# Patient Record
Sex: FEMALE | Race: WHITE | NOT HISPANIC OR LATINO | URBAN - NONMETROPOLITAN AREA
[De-identification: names, ages, dates, MRNs, and addresses within clinical notes are randomized per-mention and may not be internally consistent; named-entity substitution may affect disease eponyms.]

---

## 2017-09-18 PROBLEM — H52.4: Noted: 2017-09-18

## 2017-09-18 PROBLEM — H35.373: Noted: 2017-10-03

## 2017-09-18 PROBLEM — H40.1112: Noted: 2017-10-03

## 2017-09-18 PROBLEM — H25.812: Noted: 2017-10-03

## 2017-09-18 PROBLEM — H25.812: Noted: 2019-07-08

## 2017-09-18 PROBLEM — H40.1112: Noted: 2019-07-08

## 2018-07-03 NOTE — PATIENT DISCUSSION
Take antibiotic drops 6 times a day for 2 weeks, oral antibiotics twice a day for 10 days, follow up 10 days.

## 2019-06-26 ENCOUNTER — IMPORTED ENCOUNTER (OUTPATIENT)
Dept: URBAN - NONMETROPOLITAN AREA CLINIC 1 | Facility: CLINIC | Age: 79
End: 2019-06-26

## 2019-06-26 PROCEDURE — 92134 CPTRZ OPH DX IMG PST SGM RTA: CPT

## 2019-06-26 PROCEDURE — 99213 OFFICE O/P EST LOW 20 MIN: CPT

## 2019-06-26 NOTE — PATIENT DISCUSSION
COAG-pt states compliant since last exam (hx of non compliance)-good response to Simbrinza-continue Latanoprost OD qhs and Simbrinza OD BID. Stressed importance of compliance. Cataract OS-Visually significant.-Cataract(s) causing symptomatic impairment of visual function not correctable with a tolerable change in glasses or contact lenses lighting or non-operative means resulting in specific activity limitations and/or participation restrictions including but not limited to reading viewing television driving or meeting vocational or recreational needs. -Expectation is clearer vision and reduced glare disability after cataract removal.-Refer to Dr Shelly Abraham for cataract evaluationERM OU-OCT MAC performed and reviewed w/pt stable-Continue to monitor

## 2019-07-08 ENCOUNTER — IMPORTED ENCOUNTER (OUTPATIENT)
Dept: URBAN - NONMETROPOLITAN AREA CLINIC 1 | Facility: CLINIC | Age: 79
End: 2019-07-08

## 2019-07-08 PROCEDURE — 92014 COMPRE OPH EXAM EST PT 1/>: CPT

## 2019-07-08 NOTE — PATIENT DISCUSSION
Cataract(s)-Visually significant cataract OS . -Cataract(s) causing symptomatic impairment of visual function not correctable with a tolerable change in glasses or contact lenses lighting or non-operative means resulting in specific activity limitations and/or participation restrictions including but not limited to reading viewing television driving or meeting vocational or recreational needs. -Expectation is clearer vision and functional improvement in symptoms as well as reduced glare disability after cataract removal.-Order IOLMaster and OPD today. -Recommend Stand\Trad based on today's OPD testing and lifestyle questionnaire.-All questions were answered regarding surgery including pre and post-op medications appointments activity restrictions and anesthetic usage.-The risks benefits and alternatives and special risk factors for the patient were discussed in detail including but not limited to: bleeding infection retinal detachment vitreous loss problems with the implant and possible need for additional surgery.-Although rare the possibility of complete vision loss was discussed.-The possible need for glasses post-operatively was discussed.-Order medical clearance exam.-Patient elects to proceed with cataract surgery OS. COAG-pt states compliant since last exam (hx of non compliance)-good response to Simbrinza-continue Latanoprost OD qhs and Simbrinza OD BID. Stressed importance of compliance.  *Epiretinal membrane: OU-Discussed findings of exam in detail with the patient.-Discussed the signs of worsening of the disease.-Continue to monitor

## 2019-07-25 PROBLEM — H52.4: Noted: 2019-07-25

## 2019-07-25 PROBLEM — H25.812: Noted: 2019-07-25

## 2019-07-25 PROBLEM — H40.1112: Noted: 2019-07-25

## 2019-07-25 PROBLEM — H35.373: Noted: 2019-07-25

## 2019-07-29 ENCOUNTER — IMPORTED ENCOUNTER (OUTPATIENT)
Dept: URBAN - NONMETROPOLITAN AREA CLINIC 1 | Facility: CLINIC | Age: 79
End: 2019-07-29

## 2019-07-29 PROBLEM — H40.1112: Noted: 2019-07-29

## 2019-07-29 PROBLEM — I10: Noted: 2019-07-29

## 2019-07-29 PROBLEM — H25.812: Noted: 2019-07-29

## 2019-07-29 PROBLEM — Z01.818: Noted: 2019-07-29

## 2019-07-29 NOTE — PATIENT DISCUSSION
Medical Clearance done todayNo outstanding concernsPatient is cleared for surgery.; 's Notes: <br />

## 2019-08-09 ENCOUNTER — IMPORTED ENCOUNTER (OUTPATIENT)
Dept: URBAN - NONMETROPOLITAN AREA CLINIC 1 | Facility: CLINIC | Age: 79
End: 2019-08-09

## 2019-08-09 PROCEDURE — 99024 POSTOP FOLLOW-UP VISIT: CPT

## 2019-08-09 NOTE — PATIENT DISCUSSION
s/p PCIOL-Pt doing well s/p PCIOL. -Continue post-op gtts according to instruction sheet and sleep with eye shield over eye for 7 nights.-Avoid bending at the waist lifting anything over 5lbs and dirty or candido environments. Perisentesis done today to drain exces fluid and relieve pressure.  -RTC EZE

## 2019-08-13 ENCOUNTER — IMPORTED ENCOUNTER (OUTPATIENT)
Dept: URBAN - NONMETROPOLITAN AREA CLINIC 1 | Facility: CLINIC | Age: 79
End: 2019-08-13

## 2019-08-13 PROBLEM — Z98.42: Noted: 2019-08-13

## 2019-08-13 PROBLEM — Z96.1: Noted: 2019-08-13

## 2019-08-13 PROBLEM — H40.1112: Noted: 2019-08-13

## 2019-08-13 PROCEDURE — 99024 POSTOP FOLLOW-UP VISIT: CPT

## 2019-08-13 NOTE — PATIENT DISCUSSION
s/p PCIOL OS Stand/Trad -Pt doing well at 1 week s/p PCIOL. -Continue post-op gtts according to instruction sheet. - D/C Latanoprost OD and Simbrinza OD for 2 weeks to find out if drug reaction after 2 weeks patient is able to restart both gtts. -Okay to resume usual activites and d/c eye shield. RTC  1 month POV

## 2019-08-22 PROBLEM — Z98.42: Noted: 2019-08-22

## 2019-08-22 PROBLEM — H40.1112: Noted: 2019-08-22

## 2019-09-16 ENCOUNTER — IMPORTED ENCOUNTER (OUTPATIENT)
Dept: URBAN - NONMETROPOLITAN AREA CLINIC 1 | Facility: CLINIC | Age: 79
End: 2019-09-16

## 2019-09-16 PROCEDURE — 99024 POSTOP FOLLOW-UP VISIT: CPT

## 2020-01-14 ENCOUNTER — IMPORTED ENCOUNTER (OUTPATIENT)
Dept: URBAN - NONMETROPOLITAN AREA CLINIC 1 | Facility: CLINIC | Age: 80
End: 2020-01-14

## 2020-01-14 PROBLEM — H40.1112: Noted: 2020-01-14

## 2020-01-14 PROBLEM — Z96.1: Noted: 2020-01-14

## 2020-01-14 PROCEDURE — 92083 EXTENDED VISUAL FIELD XM: CPT

## 2020-01-14 PROCEDURE — 92014 COMPRE OPH EXAM EST PT 1/>: CPT

## 2020-01-14 NOTE — PATIENT DISCUSSION
COAG-Appears stable. -stable w/o gttsVF TODAY/ STABLE OUpseudophakia oumonitors/p rd repair ODSTABLE MONITOR

## 2020-07-21 ENCOUNTER — IMPORTED ENCOUNTER (OUTPATIENT)
Dept: URBAN - NONMETROPOLITAN AREA CLINIC 1 | Facility: CLINIC | Age: 80
End: 2020-07-21

## 2020-07-21 PROCEDURE — 92012 INTRM OPH EXAM EST PATIENT: CPT

## 2020-07-21 PROCEDURE — 92133 CPTRZD OPH DX IMG PST SGM ON: CPT

## 2021-01-21 ENCOUNTER — IMPORTED ENCOUNTER (OUTPATIENT)
Dept: URBAN - NONMETROPOLITAN AREA CLINIC 1 | Facility: CLINIC | Age: 81
End: 2021-01-21

## 2021-01-21 PROCEDURE — 92014 COMPRE OPH EXAM EST PT 1/>: CPT

## 2021-01-21 NOTE — PATIENT DISCUSSION
COAG-Appears stable. -stable w/o gtts-monitor for iop and vf changespseudophakia oumonitor for pcos/p rd repair ODSTABLE MONITOR

## 2021-08-25 ENCOUNTER — PREPPED CHART (OUTPATIENT)
Dept: RURAL CLINIC 2 | Facility: CLINIC | Age: 81
End: 2021-08-25

## 2021-08-25 ENCOUNTER — IMPORTED ENCOUNTER (OUTPATIENT)
Dept: URBAN - NONMETROPOLITAN AREA CLINIC 1 | Facility: CLINIC | Age: 81
End: 2021-08-25

## 2021-08-25 PROBLEM — H35.371: Noted: 2021-08-25

## 2021-08-25 PROBLEM — H40.1112: Noted: 2020-01-14

## 2021-08-25 PROBLEM — H35.341: Noted: 2021-08-25

## 2021-08-25 PROBLEM — Z96.1: Noted: 2021-08-25

## 2021-08-25 PROBLEM — H16.223: Noted: 2021-08-25

## 2021-08-25 PROCEDURE — 92014 COMPRE OPH EXAM EST PT 1/>: CPT

## 2021-08-25 PROCEDURE — 92083 EXTENDED VISUAL FIELD XM: CPT

## 2021-08-25 NOTE — PATIENT DISCUSSION
COAG-Appears stable. -stable w/o gtts-VF performed and reviewed w/pt -Order OCT ONH at next visit ERM/Cyst OD Monitor Ksicca recommend thera tears bid OU sample given pseudophakia oumonitor for pcos/p rd repair ODSTABLE MONITOR

## 2022-02-23 ENCOUNTER — ESTABLISHED PATIENT (OUTPATIENT)
Dept: RURAL CLINIC 2 | Facility: CLINIC | Age: 82
End: 2022-02-23

## 2022-02-23 DIAGNOSIS — Z96.1: ICD-10-CM

## 2022-02-23 DIAGNOSIS — H35.371: ICD-10-CM

## 2022-02-23 DIAGNOSIS — H40.1112: ICD-10-CM

## 2022-02-23 DIAGNOSIS — H40.1121: ICD-10-CM

## 2022-02-23 DIAGNOSIS — H16.223: ICD-10-CM

## 2022-02-23 DIAGNOSIS — Z98.890: ICD-10-CM

## 2022-02-23 PROCEDURE — 92133 CPTRZD OPH DX IMG PST SGM ON: CPT

## 2022-02-23 PROCEDURE — 92014 COMPRE OPH EXAM EST PT 1/>: CPT

## 2022-02-23 ASSESSMENT — VISUAL ACUITY
OD_SC: 20/400
OS_SC: 20/20-1
OD_PH: 20/70-2

## 2022-02-23 ASSESSMENT — TONOMETRY
OS_IOP_MMHG: 18
OD_IOP_MMHG: 20

## 2022-04-10 ASSESSMENT — VISUAL ACUITY
OS_GLARE: 20/100
OD_CC: 20/200
OD_CC: 20/400
OD_CC: 20/200
OS_CC: 20/20
OS_CC: 20/20
OD_SC: 20/400
OS_CC: 20/20
OD_CC: 20/200
OS_CC: 20/25
OS_CC: 20/30
OS_AM: 20/30
OS_SC: 20/30
OS_SC: 20/40
OD_SC: 20/400

## 2022-04-10 ASSESSMENT — TONOMETRY
OS_IOP_MMHG: 45
OD_IOP_MMHG: 19
OS_IOP_MMHG: 18
OD_IOP_MMHG: 16
OS_IOP_MMHG: 17
OS_IOP_MMHG: 16
OS_IOP_MMHG: 14
OS_IOP_MMHG: 20
OS_IOP_MMHG: 17
OD_IOP_MMHG: 19
OD_IOP_MMHG: 13
OS_IOP_MMHG: 22
OD_IOP_MMHG: 18
OD_IOP_MMHG: 19
OD_IOP_MMHG: 20
OS_IOP_MMHG: 17
OS_IOP_MMHG: 21

## 2022-08-01 NOTE — PATIENT DISCUSSION
COAG-Appears stable. -stable w/o gttsoct today/stable oupseudophakia oumonitor for pcos/p rd repair ODSTABLE MONITOR pulse oximetry

## 2022-08-23 ENCOUNTER — ESTABLISHED PATIENT (OUTPATIENT)
Dept: RURAL CLINIC 2 | Facility: CLINIC | Age: 82
End: 2022-08-23

## 2022-08-23 DIAGNOSIS — H40.1112: ICD-10-CM

## 2022-08-23 DIAGNOSIS — Z98.890: ICD-10-CM

## 2022-08-23 DIAGNOSIS — H35.371: ICD-10-CM

## 2022-08-23 DIAGNOSIS — H40.1121: ICD-10-CM

## 2022-08-23 DIAGNOSIS — Z96.1: ICD-10-CM

## 2022-08-23 DIAGNOSIS — H16.223: ICD-10-CM

## 2022-08-23 PROCEDURE — 92014 COMPRE OPH EXAM EST PT 1/>: CPT

## 2022-08-23 PROCEDURE — 92134 CPTRZ OPH DX IMG PST SGM RTA: CPT

## 2022-08-23 ASSESSMENT — TONOMETRY
OS_IOP_MMHG: 16
OD_IOP_MMHG: 18

## 2022-08-23 ASSESSMENT — VISUAL ACUITY
OS_SC: 20/25
OD_SC: 20/100
OD_PH: 20/70-2

## 2022-12-27 ENCOUNTER — ESTABLISHED PATIENT (OUTPATIENT)
Dept: RURAL CLINIC 2 | Facility: CLINIC | Age: 82
End: 2022-12-27

## 2022-12-27 PROCEDURE — 92083 EXTENDED VISUAL FIELD XM: CPT

## 2022-12-27 PROCEDURE — 99214 OFFICE O/P EST MOD 30 MIN: CPT

## 2022-12-27 RX ORDER — DORZOLAMIDE HYDROCHLORIDE TIMOLOL MALEATE 20; 5 MG/ML; MG/ML: 1 SOLUTION/ DROPS OPHTHALMIC TWICE A DAY

## 2022-12-27 ASSESSMENT — VISUAL ACUITY
OD_PH: 20/80+2
OS_SC: 20/25
OD_SC: 20/100

## 2022-12-27 ASSESSMENT — TONOMETRY
OS_IOP_MMHG: 19
OD_IOP_MMHG: 25

## 2022-12-27 NOTE — PATIENT DISCUSSION
The IOP is in the target range. The visual field is without clear signs of progression. The patient will continue the current management (no therapy).

## 2023-06-12 ENCOUNTER — ESTABLISHED PATIENT (OUTPATIENT)
Dept: RURAL CLINIC 2 | Facility: CLINIC | Age: 83
End: 2023-06-12

## 2023-06-12 DIAGNOSIS — Z98.890: ICD-10-CM

## 2023-06-12 DIAGNOSIS — H35.371: ICD-10-CM

## 2023-06-12 DIAGNOSIS — H16.223: ICD-10-CM

## 2023-06-12 DIAGNOSIS — Z96.1: ICD-10-CM

## 2023-06-12 DIAGNOSIS — H40.1121: ICD-10-CM

## 2023-06-12 DIAGNOSIS — H40.1112: ICD-10-CM

## 2023-06-12 PROCEDURE — 99213 OFFICE O/P EST LOW 20 MIN: CPT

## 2023-06-12 ASSESSMENT — VISUAL ACUITY
OD_SC: 20/100
OD_PH: 20/50
OS_SC: 20/20

## 2023-06-12 ASSESSMENT — TONOMETRY
OD_IOP_MMHG: 15
OS_IOP_MMHG: 16

## 2023-10-09 ENCOUNTER — ESTABLISHED PATIENT (OUTPATIENT)
Dept: RURAL CLINIC 2 | Facility: CLINIC | Age: 83
End: 2023-10-09

## 2023-10-09 DIAGNOSIS — H40.1121: ICD-10-CM

## 2023-10-09 DIAGNOSIS — H35.371: ICD-10-CM

## 2023-10-09 DIAGNOSIS — Z98.890: ICD-10-CM

## 2023-10-09 DIAGNOSIS — H16.223: ICD-10-CM

## 2023-10-09 DIAGNOSIS — H40.1112: ICD-10-CM

## 2023-10-09 DIAGNOSIS — Z96.1: ICD-10-CM

## 2023-10-09 PROCEDURE — 99213 OFFICE O/P EST LOW 20 MIN: CPT

## 2023-10-09 PROCEDURE — 92083 EXTENDED VISUAL FIELD XM: CPT

## 2023-10-09 ASSESSMENT — VISUAL ACUITY
OS_SC: 20/20
OD_PH: 20/80
OD_SC: 20/200

## 2023-10-09 ASSESSMENT — TONOMETRY
OS_IOP_MMHG: 17
OD_IOP_MMHG: 17

## 2024-02-13 ENCOUNTER — FOLLOW UP (OUTPATIENT)
Dept: RURAL CLINIC 2 | Facility: CLINIC | Age: 84
End: 2024-02-13

## 2024-02-13 DIAGNOSIS — H40.1112: ICD-10-CM

## 2024-02-13 DIAGNOSIS — Z96.1: ICD-10-CM

## 2024-02-13 DIAGNOSIS — H40.1121: ICD-10-CM

## 2024-02-13 DIAGNOSIS — H35.371: ICD-10-CM

## 2024-02-13 DIAGNOSIS — Z98.890: ICD-10-CM

## 2024-02-13 DIAGNOSIS — H16.223: ICD-10-CM

## 2024-02-13 PROCEDURE — 92133 CPTRZD OPH DX IMG PST SGM ON: CPT

## 2024-02-13 PROCEDURE — 92014 COMPRE OPH EXAM EST PT 1/>: CPT

## 2024-02-13 ASSESSMENT — VISUAL ACUITY
OS_SC: 20/25+2
OD_SC: 20/200
OD_PH: 20/70

## 2024-02-13 ASSESSMENT — TONOMETRY
OS_IOP_MMHG: 17
OD_IOP_MMHG: 17

## 2024-06-11 ENCOUNTER — FOLLOW UP (OUTPATIENT)
Dept: RURAL CLINIC 2 | Facility: CLINIC | Age: 84
End: 2024-06-11

## 2024-06-11 DIAGNOSIS — Z98.890: ICD-10-CM

## 2024-06-11 DIAGNOSIS — H40.1121: ICD-10-CM

## 2024-06-11 DIAGNOSIS — H16.223: ICD-10-CM

## 2024-06-11 DIAGNOSIS — Z96.1: ICD-10-CM

## 2024-06-11 DIAGNOSIS — H35.371: ICD-10-CM

## 2024-06-11 DIAGNOSIS — H40.1112: ICD-10-CM

## 2024-06-11 PROCEDURE — 92083 EXTENDED VISUAL FIELD XM: CPT

## 2024-06-11 PROCEDURE — 99213 OFFICE O/P EST LOW 20 MIN: CPT

## 2024-06-11 ASSESSMENT — VISUAL ACUITY
OD_SC: 20/200+1
OS_SC: 20/20-2
OD_PH: 20/70+2

## 2024-06-11 ASSESSMENT — TONOMETRY
OS_IOP_MMHG: 16
OD_IOP_MMHG: 16

## 2025-05-02 ENCOUNTER — FOLLOW UP (OUTPATIENT)
Age: 85
End: 2025-05-02

## 2025-05-02 DIAGNOSIS — H40.1112: ICD-10-CM

## 2025-05-02 DIAGNOSIS — H16.233: ICD-10-CM

## 2025-05-02 DIAGNOSIS — H35.371: ICD-10-CM

## 2025-05-02 DIAGNOSIS — H40.1121: ICD-10-CM

## 2025-05-02 DIAGNOSIS — Z98.890: ICD-10-CM

## 2025-05-02 DIAGNOSIS — H16.223: ICD-10-CM

## 2025-05-02 PROCEDURE — 99214 OFFICE O/P EST MOD 30 MIN: CPT

## 2025-05-02 PROCEDURE — 92133 CPTRZD OPH DX IMG PST SGM ON: CPT

## 2025-08-05 ENCOUNTER — DIAGNOSTICS ONLY (OUTPATIENT)
Age: 85
End: 2025-08-05

## 2025-08-05 DIAGNOSIS — H40.1112: ICD-10-CM

## 2025-08-05 DIAGNOSIS — Z98.890: ICD-10-CM

## 2025-08-05 DIAGNOSIS — Z96.1: ICD-10-CM

## 2025-08-05 DIAGNOSIS — H40.1121: ICD-10-CM

## 2025-08-05 DIAGNOSIS — H35.371: ICD-10-CM

## 2025-08-05 DIAGNOSIS — H16.233: ICD-10-CM

## 2025-08-05 DIAGNOSIS — H16.223: ICD-10-CM

## 2025-08-05 PROCEDURE — 99213 OFFICE O/P EST LOW 20 MIN: CPT

## 2025-08-05 PROCEDURE — 92083 EXTENDED VISUAL FIELD XM: CPT
